# Patient Record
Sex: FEMALE | Race: WHITE | NOT HISPANIC OR LATINO
[De-identification: names, ages, dates, MRNs, and addresses within clinical notes are randomized per-mention and may not be internally consistent; named-entity substitution may affect disease eponyms.]

---

## 2021-11-03 ENCOUNTER — APPOINTMENT (OUTPATIENT)
Dept: PULMONOLOGY | Facility: CLINIC | Age: 71
End: 2021-11-03
Payer: MEDICARE

## 2021-11-03 ENCOUNTER — NON-APPOINTMENT (OUTPATIENT)
Age: 71
End: 2021-11-03

## 2021-11-03 VITALS
SYSTOLIC BLOOD PRESSURE: 163 MMHG | HEIGHT: 62 IN | TEMPERATURE: 97.6 F | OXYGEN SATURATION: 96 % | BODY MASS INDEX: 30.36 KG/M2 | HEART RATE: 65 BPM | DIASTOLIC BLOOD PRESSURE: 91 MMHG | WEIGHT: 165 LBS

## 2021-11-03 DIAGNOSIS — M19.90 UNSPECIFIED OSTEOARTHRITIS, UNSPECIFIED SITE: ICD-10-CM

## 2021-11-03 DIAGNOSIS — I10 ESSENTIAL (PRIMARY) HYPERTENSION: ICD-10-CM

## 2021-11-03 DIAGNOSIS — E78.00 PURE HYPERCHOLESTEROLEMIA, UNSPECIFIED: ICD-10-CM

## 2021-11-03 DIAGNOSIS — U07.1 COVID-19: ICD-10-CM

## 2021-11-03 DIAGNOSIS — Z87.01 PERSONAL HISTORY OF PNEUMONIA (RECURRENT): ICD-10-CM

## 2021-11-03 PROBLEM — Z00.00 ENCOUNTER FOR PREVENTIVE HEALTH EXAMINATION: Status: ACTIVE | Noted: 2021-11-03

## 2021-11-03 PROCEDURE — 99204 OFFICE O/P NEW MOD 45 MIN: CPT

## 2021-11-03 RX ORDER — CELECOXIB 200 MG/1
200 CAPSULE ORAL
Refills: 0 | Status: ACTIVE | COMMUNITY

## 2021-11-03 RX ORDER — ASPIRIN ENTERIC COATED TABLETS 81 MG 81 MG/1
81 TABLET, DELAYED RELEASE ORAL
Refills: 0 | Status: ACTIVE | COMMUNITY

## 2021-11-03 RX ORDER — ROSUVASTATIN CALCIUM 5 MG/1
5 TABLET, FILM COATED ORAL
Refills: 0 | Status: ACTIVE | COMMUNITY

## 2021-11-03 RX ORDER — VALSARTAN AND HYDROCHLOROTHIAZIDE 160; 12.5 MG/1; MG/1
160-12.5 TABLET, FILM COATED ORAL
Refills: 0 | Status: ACTIVE | COMMUNITY

## 2021-11-03 RX ORDER — VALSARTAN 160 MG/1
160 TABLET, COATED ORAL
Refills: 0 | Status: ACTIVE | COMMUNITY

## 2021-11-03 NOTE — PHYSICAL EXAM
[No Acute Distress] : no acute distress [Normal Rate/Rhythm] : normal rate/rhythm [Normal S1, S2] : normal s1, s2 [No Murmurs] : no murmurs [No Resp Distress] : no resp distress [Clear to Auscultation Bilaterally] : clear to auscultation bilaterally [No Abnormalities] : no abnormalities [No Clubbing] : no clubbing [No Cyanosis] : no cyanosis [No Focal Deficits] : no focal deficits [Oriented x3] : oriented x3 [TextBox_2] : overweight

## 2021-11-03 NOTE — REVIEW OF SYSTEMS
[Nasal Congestion] : nasal congestion [Wheezing] : no wheezing [SOB on Exertion] : sob on exertion [Seasonal Allergies] : no seasonal allergies [GERD] : gerd [Dysphagia] : no dysphagia [Headache] : no headache [Dizziness] : no dizziness [Numbness] : no numbness [Memory Loss] : no memory loss [Tremor] : no tremor [Depression] : depression [Anxiety] : no anxiety [Panic Attacks] : no panic attacks [Negative] : Hematologic [TextBox_94] : rheumatoid arthritis  [TextBox_144] : ? early DM, past hx of borderline thyroid issues

## 2021-11-03 NOTE — ASSESSMENT
[FreeTextEntry1] : covid CXR normal 9/2, then on 9/7 there was a bilat infiltrate she was in the hospital and discharged on 5L of O2 she is now off the O2  2) The cxr of 10/2/21 is now again improving but istill has residual Ct scan and PFT  3) sample of Breo 200/25

## 2021-11-03 NOTE — HISTORY OF PRESENT ILLNESS
[Former] : former [Never] : never [Continuous] : Continuous [NC] : Nasal Cannula [PRN] : As needed [TextBox_4] : the patient is 70 year F with a hx of COVID along wiith her  and daughter  They all had PNeumonia and her  had the worse condition and was on the ventilator  The patient has not had any therapy, monoclonal Anti bodies but had remdesivr and decadron  She had beenon O2 and now does not require it. X smoker .5 packs/day for 15-20yrs   She had no chronic SX  She had been active  with her grandchildren that are toddlers    [TextBox_11] : 0.5 [TextBox_13] : 15 [YearQuit] : 1990 [FreeTextEntry1] : 5

## 2021-11-03 NOTE — REASON FOR VISIT
[Initial] : an initial visit [Family Member] : family member [TextBox_44] : assessment. Pt was diagnosed with COVID-19 in August and then COVID pneumonia shortly after, was hospitalized for 10 days where she was put on 5L of O2. Pt did have a lingering cough for a while but sx subsided with help of Breo prescribe by primary Dr. Boykin. Pt still experiences SOB with exertion. Pt states she does not need O2 anymore. [TextBox_13] : W

## 2021-11-23 ENCOUNTER — TRANSCRIPTION ENCOUNTER (OUTPATIENT)
Age: 71
End: 2021-11-23

## 2021-11-23 ENCOUNTER — APPOINTMENT (OUTPATIENT)
Dept: PULMONOLOGY | Facility: CLINIC | Age: 71
End: 2021-11-23
Payer: MEDICARE

## 2021-11-23 VITALS
SYSTOLIC BLOOD PRESSURE: 160 MMHG | WEIGHT: 165 LBS | TEMPERATURE: 96.9 F | BODY MASS INDEX: 30.36 KG/M2 | OXYGEN SATURATION: 95 % | HEART RATE: 63 BPM | HEIGHT: 62 IN | DIASTOLIC BLOOD PRESSURE: 80 MMHG

## 2021-11-23 DIAGNOSIS — J12.82 COVID-19: ICD-10-CM

## 2021-11-23 DIAGNOSIS — U07.1 COVID-19: ICD-10-CM

## 2021-11-23 PROCEDURE — 99214 OFFICE O/P EST MOD 30 MIN: CPT

## 2021-11-23 NOTE — ASSESSMENT
[FreeTextEntry1] : covid CXR normal 9/2, then on 9/7 there was a bilat infiltrate she was in the hospital and discharged on 5L of O2 she is now off the O2  2) The cxr of 10/2/21 is now again improving but istill has residual Ct scan and PFT  3) sample of Breo 200/25\par \par \par 11/23/21  The PFTs, and her recent Cxr on 11/12/21  were both normal She has some residual AWAD and sees a cardiologist in the city  The cxr was reviewed from a disc provided by the patient  it is normal as well as the PFts that were reviewed with the patient and her daughter    f/U in 6mos if all is OK could cancel the week or 2 before  time spent was 33mnutes  data review counselling, cxr review and PE and hx

## 2021-11-23 NOTE — REASON FOR VISIT
[Follow-Up] : a follow-up visit [Shortness of Breath] : shortness of breath [Family Member] : family member [TextBox_44] : 3 weeks. Chest xray and PFT done. Pt still experiencing similar sx or SOB on exertion.

## 2021-11-23 NOTE — REVIEW OF SYSTEMS
[Nasal Congestion] : nasal congestion [Wheezing] : no wheezing [SOB on Exertion] : sob on exertion [Seasonal Allergies] : no seasonal allergies [GERD] : gerd [Dysphagia] : no dysphagia [Headache] : no headache [Dizziness] : no dizziness [Numbness] : no numbness [Memory Loss] : no memory loss [Tremor] : no tremor [Depression] : depression [Anxiety] : no anxiety [Panic Attacks] : no panic attacks [Negative] : Hematologic [TextBox_30] : better than before  [TextBox_94] : rheumatoid arthritis  [TextBox_144] : ? early DM, past hx of borderline thyroid issues